# Patient Record
Sex: FEMALE | URBAN - METROPOLITAN AREA
[De-identification: names, ages, dates, MRNs, and addresses within clinical notes are randomized per-mention and may not be internally consistent; named-entity substitution may affect disease eponyms.]

---

## 2023-02-10 ENCOUNTER — OFFICE VISIT (OUTPATIENT)
Dept: URBAN - METROPOLITAN AREA CLINIC 7 | Facility: CLINIC | Age: 43
End: 2023-02-10

## 2023-02-10 DIAGNOSIS — H44.2B1 DEGENERATIVE MYOPIA WITH MACULAR HOLE, RIGHT EYE: Primary | ICD-10-CM

## 2023-02-10 DIAGNOSIS — H25.89 OTHER AGE-RELATED CATARACT: ICD-10-CM

## 2023-02-10 PROCEDURE — 92134 CPTRZ OPH DX IMG PST SGM RTA: CPT | Performed by: STUDENT IN AN ORGANIZED HEALTH CARE EDUCATION/TRAINING PROGRAM

## 2023-02-10 PROCEDURE — 99204 OFFICE O/P NEW MOD 45 MIN: CPT | Performed by: STUDENT IN AN ORGANIZED HEALTH CARE EDUCATION/TRAINING PROGRAM

## 2023-02-10 ASSESSMENT — INTRAOCULAR PRESSURE
OD: 13
OS: 13

## 2023-02-10 NOTE — IMPRESSION/PLAN
Impression: Dense Cataract with total RD and PVR, OS Plan: Grossly attached OS, aphakic, but now NLP. Comfort care.

## 2023-02-10 NOTE — IMPRESSION/PLAN
Impression: Degenerative myopia with macular hole, right eye: H44.2B1. OCT:
OD: FTMH with mild ERM and SRF
OS: no view Plan: -has not been seen here since 2013
-has been following eye provider elsewhere and has not been able to get in for surgery in the past 2 years
-has now developed full-thickness MH with decreased VA
-complaining of floaters blood in vision
-discussed r/b/a vitrectomy with gas vs oil, patient would like to proceed PLAN: 25g PPV / MP / EL / gas vs oil OD next available

## 2023-02-25 ENCOUNTER — POST-OPERATIVE VISIT (OUTPATIENT)
Dept: URBAN - METROPOLITAN AREA CLINIC 7 | Facility: CLINIC | Age: 43
End: 2023-02-25

## 2023-02-25 DIAGNOSIS — H44.2B1 DEGENERATIVE MYOPIA WITH MACULAR HOLE, RIGHT EYE: Primary | ICD-10-CM

## 2023-02-25 PROCEDURE — 99024 POSTOP FOLLOW-UP VISIT: CPT | Performed by: OPHTHALMOLOGY

## 2023-02-25 ASSESSMENT — INTRAOCULAR PRESSURE: OD: 8

## 2023-02-25 NOTE — IMPRESSION/PLAN
Impression: S/P 25g PPV / MP / EL / gas vs oil OD - 1 Day. Degenerative myopia with macular hole, right eye  H44.2B1.  Plan: --retina attached under gas
--no s/s infection
--IOP acceptable
--start PF/Oflox QID
--RD/endoph WS discussed
--alt restrictions/positioning discussed
--f/u 1 week PO OD

## 2023-03-03 ENCOUNTER — POST-OPERATIVE VISIT (OUTPATIENT)
Dept: URBAN - METROPOLITAN AREA CLINIC 7 | Facility: CLINIC | Age: 43
End: 2023-03-03

## 2023-03-03 DIAGNOSIS — H44.2B1 DEGENERATIVE MYOPIA WITH MACULAR HOLE, RIGHT EYE: Primary | ICD-10-CM

## 2023-03-03 PROCEDURE — 99024 POSTOP FOLLOW-UP VISIT: CPT | Performed by: STUDENT IN AN ORGANIZED HEALTH CARE EDUCATION/TRAINING PROGRAM

## 2023-03-03 ASSESSMENT — INTRAOCULAR PRESSURE: OD: 12

## 2023-03-03 NOTE — IMPRESSION/PLAN
Impression: S/P 25g PPV / MP / EL / gas OD - 7 Days. Degenerative myopia with macular hole, right eye  H44.2B1.
-also s/p laser for lattice Plan: -doing well postop week 1
-stop oflox, taper PF 3/2/1/stop
-gradually resume normal activities
-return precautions discussed RTC: 1 month POS OD OCT OD

## 2023-03-31 ENCOUNTER — POST-OPERATIVE VISIT (OUTPATIENT)
Dept: URBAN - METROPOLITAN AREA CLINIC 7 | Facility: CLINIC | Age: 43
End: 2023-03-31

## 2023-03-31 DIAGNOSIS — H44.2B1 DEGENERATIVE MYOPIA WITH MACULAR HOLE, RIGHT EYE: Primary | ICD-10-CM

## 2023-03-31 PROCEDURE — 92134 CPTRZ OPH DX IMG PST SGM RTA: CPT | Performed by: STUDENT IN AN ORGANIZED HEALTH CARE EDUCATION/TRAINING PROGRAM

## 2023-03-31 PROCEDURE — 99024 POSTOP FOLLOW-UP VISIT: CPT | Performed by: STUDENT IN AN ORGANIZED HEALTH CARE EDUCATION/TRAINING PROGRAM

## 2023-03-31 ASSESSMENT — INTRAOCULAR PRESSURE
OD: 18
OS: 21

## 2023-03-31 NOTE — IMPRESSION/PLAN
Impression: S/P 25g PPV / MP / EL / gas OD - 35 Days. Degenerative myopia with macular hole, right eye  H44.2B1. Plan: -doing well s/p PPV / MP / SF6 gas for chronic mac hole -- hole remains closed -observe RTC 2-3 months DFE OCT OU

## 2023-06-22 ENCOUNTER — OFFICE VISIT (OUTPATIENT)
Dept: URBAN - METROPOLITAN AREA CLINIC 7 | Facility: CLINIC | Age: 43
End: 2023-06-22

## 2023-06-22 DIAGNOSIS — H25.89 OTHER AGE-RELATED CATARACT: ICD-10-CM

## 2023-06-22 DIAGNOSIS — H44.2B1 DEGENERATIVE MYOPIA WITH MACULAR HOLE, RIGHT EYE: Primary | ICD-10-CM

## 2023-06-22 PROCEDURE — 92134 CPTRZ OPH DX IMG PST SGM RTA: CPT | Performed by: STUDENT IN AN ORGANIZED HEALTH CARE EDUCATION/TRAINING PROGRAM

## 2023-06-22 PROCEDURE — 99214 OFFICE O/P EST MOD 30 MIN: CPT | Performed by: STUDENT IN AN ORGANIZED HEALTH CARE EDUCATION/TRAINING PROGRAM

## 2023-06-22 ASSESSMENT — INTRAOCULAR PRESSURE
OS: 20
OD: 21

## 2023-06-22 NOTE — IMPRESSION/PLAN
Impression: S/P 25g PPV / MP / EL / gas OD - Degenerative myopia with macular hole, right eye  H44.2B1. 
OCT: myopic, FTMH re-opened OD;  no view OS Plan: -s/p PPV/MP/gas OD for chronic MH and floaters
-hole reopened unfortunately
-discussed options, including repeat PPV/MP vs observation, and given chronicity of the hole, patient elects to observe
-discussed cha hx chronic mac hole, including gradually decreasing vision over time
-return precautions
-monocular precautions
-low vision referral

RTC 4-6 months DFE OCT OU